# Patient Record
Sex: FEMALE | Race: WHITE | NOT HISPANIC OR LATINO
[De-identification: names, ages, dates, MRNs, and addresses within clinical notes are randomized per-mention and may not be internally consistent; named-entity substitution may affect disease eponyms.]

---

## 2024-07-24 PROBLEM — Z00.00 ENCOUNTER FOR PREVENTIVE HEALTH EXAMINATION: Status: ACTIVE | Noted: 2024-07-24

## 2024-07-26 ENCOUNTER — APPOINTMENT (OUTPATIENT)
Dept: ORTHOPEDIC SURGERY | Facility: CLINIC | Age: 79
End: 2024-07-26
Payer: MEDICARE

## 2024-07-26 DIAGNOSIS — M75.41 IMPINGEMENT SYNDROME OF RIGHT SHOULDER: ICD-10-CM

## 2024-07-26 DIAGNOSIS — M75.51 BURSITIS OF RIGHT SHOULDER: ICD-10-CM

## 2024-07-26 DIAGNOSIS — M75.111 INCOMPLETE ROTATOR CUFF TEAR OR RUPTURE OF RIGHT SHOULDER, NOT SPECIFIED AS TRAUMATIC: ICD-10-CM

## 2024-07-26 PROCEDURE — 99214 OFFICE O/P EST MOD 30 MIN: CPT | Mod: 25

## 2024-07-26 PROCEDURE — 99204 OFFICE O/P NEW MOD 45 MIN: CPT | Mod: 25

## 2024-07-26 PROCEDURE — 20611 DRAIN/INJ JOINT/BURSA W/US: CPT | Mod: RT

## 2024-07-26 PROCEDURE — 73030 X-RAY EXAM OF SHOULDER: CPT | Mod: RT

## 2024-07-26 RX ORDER — CELECOXIB 200 MG/1
200 CAPSULE ORAL
Qty: 30 | Refills: 1 | Status: ACTIVE | COMMUNITY
Start: 2024-07-26 | End: 1900-01-01

## 2024-07-26 NOTE — PHYSICAL EXAM
[Right] : right shoulder [There are no fractures, subluxations or dislocations. No significant abnormalities are seen] : There are no fractures, subluxations or dislocations. No significant abnormalities are seen [Type 2 acromion] : Type 2 acromion [de-identified] : Constitutional: The general appearance of the patient is well developed, well nourished, no deformities and well groomed. Normal   Gait: Gait and function is as follows: normal gait.   Skin: Head and neck visualized skin is normal. Left upper extremity visualized skin is normal. Right upper extremity visualized skin is normal. Thoracic Skin of the thoracic spine shows visualized skin is normal.   Cardiovascular: palpable radial pulse bilaterally, good capillary refill in digits of the bilateral upper extremities and no temperature or color changes in the bilateral upper extremities.   Lymphatic: Normal Palpation of lymph nodes in the cervical.   Neurologic: fine motor control in the bilateral upper extremities is intact. Deep Tendon Reflexes in Upper and Lower Extremities Negative Finney's in the bilateral upper extremities. The patient is oriented to time, place and person. Sensation to light touch intact in the bilateral upper extremities. Mood and Affect is normal.   Right Shoulder: Inspection of the shoulder/upper arm is as follows: no scapula winging, no biceps deformity and no AC joint deformity. Palpation of the shoulder/upper arm is as follows: There is tenderness at the proximal biceps tendon. Range of motion of the shoulder is as follows: Pain with internal rotation, external rotation, abduction and forward flexion. Strength of the shoulder is as follows: Supraspinatus 4/5. External Rotation 4/5. Internal Rotation 4/5. Deltoid 5/5 Ligament Stability and Special Tests of the shoulder is as follows: Neer test is positive. Cosme' test is positive. Speed's test is positive   Left Shoulder: Inspection of the shoulder/upper arm is as follows: There is mild pain with range of motion of the shoulder range of motion of the shoulder    Neck:   Inspection / Palpation of the cervical spine is as follows: mild paracervical tenderness. Range of motion of the cervical spine is as follows: moderately decreased range of motion of the cervical spine. Stability testing for the cervical spine is as follows Stable range of motion.   Back, including spine: Inspection / Palpation of the thoracic/lumbar spine is as follows: There is a full, pain free, stable range of motion of the thoracic spine with a normal tone and not tenderness to palpation..

## 2024-07-26 NOTE — PROCEDURE
[FreeTextEntry3] : Large Joint Injection was performed because of pain and inflammation.   Anesthesia: ethyl chloride sprayed topically..   Kenalog: An injection of Kenalog 40 mg , 1 cc.   Lidocaine: 3 cc.   Medication was injected in the right GH joint. Patient has tried OTC's including aspirin, Ibuprofen, Aleve etc or prescription NSAIDS, and/or exercises at home and/ or physical therapy without satisfactory response and Patient has decreased mobility in the joint. After verbal consent using sterile preparation and technique. The risks, benefits, and alternatives to cortisone injection were explained in full to the patient. Risks outlined include but are not limited to infection, sepsis, bleeding, scarring, skin discoloration, temporary increase in pain, syncopal episode, failure to resolve symptoms, allergic reaction, symptom recurrence, and elevation of blood sugar in diabetics. Patient understood the risks. All questions were answered. After discussion of options, patient requested an injection. Oral informed consent was obtained and sterile prep was done of the injection site. Sterile technique was utilized for the procedure including the preparation of the solutions used for the injection. Patient tolerated the procedure well. Advised to ice the injection site this evening. Prep with alcohol locally to site. Sterile technique used. Patient tolerated procedure well. Post Procedure Instructions: Patient was advised to call if redness, pain, or fever occur and apply ice for 15 min. out of every hour for the next 12-24 hours as tolerated. patient was advised to rest the joint(s) for 7 days.   Ultrasound Guidance was used for the following reasons: prior failure or difficult injection.   Ultrasound guided injection was performed of the shoulder, visualization of the needle and placement of injection was performed without complication.

## 2024-07-26 NOTE — HISTORY OF PRESENT ILLNESS
[de-identified] : 79-year-old female presenting with right shoulder pain x 2 months.  Denies any specific injury or trauma.  Pain is located to the lateral aspect of her shoulder.  She has noticed difficulty with activities daily living including getting dressed.  Denies any previous difficulty to her shoulder.  Pain is currently constant, moderate, 6 out of 10.  Denies any numbness or tingling.  Denies any radicular complaints.  Does report nighttime discomfort.

## 2024-07-26 NOTE — DISCUSSION/SUMMARY
[de-identified] : 79-year-old female with right shoulder pain x 2 months. Physical exam consistent with rotator cuff syndrome/impingement syndrome. X-rays are nondiagnostic. Pt was treated with a glenohumeral joint injection for diagnostic and therapeutic purposes Physical therapy script given Follow up 6 weeks    (1) We discussed a comprehensive treatment plans that included a prescription management plan involving the use of prescription strength medications to include Ibuprofen 600-800 mg TID, versus 500-650 mg Tylenol. We also discussed prescribing topical diclofenac (Voltaren gel) as well as once daily Meloxicam 15 mg. (2) The patient has More Than One chronic injuries/illnesses as outlined, discussed, and documented by ICD 10 codes listed, as well as the HPI and Plan section. There is a moderate risk of morbidity with further treatment, especially from use of prescription strength medications and possible side effects of these medications which include upset stomach and cardiac/renal issues with long term use were discussed. (3) I recommended that the patient follow-up with their medical physician to discuss any significant specific potential issues with long term use such as interactions with current medications or with exacerbation of underlying medical morbidities.   Attestation: I, Michelle Cross , attest that this documentation has been prepared under the direction and in the presence of Provider Alex Haq MD. The documentation recorded by the scribe, in my presence, accurately reflects the service I personally performed, and the decisions made by me with my edits as appropriate. Alex Haq MD

## 2024-08-15 ENCOUNTER — OFFICE (OUTPATIENT)
Facility: LOCATION | Age: 79
Setting detail: OPHTHALMOLOGY
End: 2024-08-15
Payer: MEDICARE

## 2024-08-15 DIAGNOSIS — H11.153: ICD-10-CM

## 2024-08-15 DIAGNOSIS — H16.223: ICD-10-CM

## 2024-08-15 DIAGNOSIS — H26.493: ICD-10-CM

## 2024-08-15 DIAGNOSIS — H35.033: ICD-10-CM

## 2024-08-15 DIAGNOSIS — H52.4: ICD-10-CM

## 2024-08-15 DIAGNOSIS — H31.29: ICD-10-CM

## 2024-08-15 PROBLEM — H01.001 BLEPHARITIS; RIGHT UPPER LID, RIGHT LOWER LID: Status: ACTIVE | Noted: 2024-08-15

## 2024-08-15 PROBLEM — H01.002 BLEPHARITIS; RIGHT UPPER LID, RIGHT LOWER LID: Status: ACTIVE | Noted: 2024-08-15

## 2024-08-15 PROBLEM — H52.13 MYOPIA; BOTH EYES: Status: ACTIVE | Noted: 2024-08-15

## 2024-08-15 PROCEDURE — 92014 COMPRE OPH EXAM EST PT 1/>: CPT | Performed by: OPTOMETRIST

## 2024-08-15 PROCEDURE — 92015 DETERMINE REFRACTIVE STATE: CPT | Performed by: OPTOMETRIST

## 2024-08-15 PROCEDURE — 92250 FUNDUS PHOTOGRAPHY W/I&R: CPT | Performed by: OPTOMETRIST

## 2024-08-15 PROCEDURE — CONTACT LE COOPER-: Performed by: OPTOMETRIST

## 2024-08-15 ASSESSMENT — LID EXAM ASSESSMENTS: OD_BLEPHARITIS: RLL RUL T

## 2024-08-15 ASSESSMENT — CONFRONTATIONAL VISUAL FIELD TEST (CVF)
OD_FINDINGS: FULL
OS_FINDINGS: FULL

## 2024-08-16 PROBLEM — Z96.1 PSEUDOPHAKIA ; RIGHT EYE: Status: ACTIVE | Noted: 2024-08-15

## 2025-06-26 ENCOUNTER — OFFICE (OUTPATIENT)
Facility: LOCATION | Age: 80
Setting detail: OPHTHALMOLOGY
End: 2025-06-26
Payer: MEDICARE

## 2025-06-26 ENCOUNTER — RX ONLY (RX ONLY)
Age: 80
End: 2025-06-26

## 2025-06-26 DIAGNOSIS — H16.223: ICD-10-CM

## 2025-06-26 DIAGNOSIS — H11.153: ICD-10-CM

## 2025-06-26 DIAGNOSIS — H52.4: ICD-10-CM

## 2025-06-26 DIAGNOSIS — H35.033: ICD-10-CM

## 2025-06-26 DIAGNOSIS — H26.493: ICD-10-CM

## 2025-06-26 DIAGNOSIS — H35.62: ICD-10-CM

## 2025-06-26 PROCEDURE — 92250 FUNDUS PHOTOGRAPHY W/I&R: CPT | Performed by: OPTOMETRIST

## 2025-06-26 PROCEDURE — 92014 COMPRE OPH EXAM EST PT 1/>: CPT | Performed by: OPTOMETRIST

## 2025-06-26 PROCEDURE — 92015 DETERMINE REFRACTIVE STATE: CPT | Mod: GA | Performed by: OPTOMETRIST

## 2025-06-26 PROCEDURE — CONTACT LE COOPER-: Performed by: OPTOMETRIST

## 2025-06-26 ASSESSMENT — REFRACTION_MANIFEST
OU_VA: 20/20
OS_VA2: 20/20
OD_ADD: +2.25
OS_SPHERE: -1.25
OU_VA: 20/20
OS_AXIS: 160
OS_VA1: 20/20
OD_AXIS: 175
OS_SPHERE: -1.50
OD_VA1: 20/25-2
OD_VA2: 20/20
OD_AXIS: 170
OD_SPHERE: -0.50
OD_ADD: +2.50
OS_ADD: +2.25
OD_VA1: 20/20
OD_CYLINDER: +1.00
OS_CYLINDER: +1.00
OD_CYLINDER: +0.75
OS_ADD: +2.50
OS_VA1: 20/20
OS_CYLINDER: SPHERE
OD_SPHERE: -0.50

## 2025-06-26 ASSESSMENT — REFRACTION_AUTOREFRACTION
OD_CYLINDER: +0.75
OS_AXIS: 161
OS_SPHERE: -1.50
OD_SPHERE: -0.50
OD_AXIS: 165
OS_CYLINDER: +0.25

## 2025-06-26 ASSESSMENT — SUPERFICIAL PUNCTATE KERATITIS (SPK)
OD_SPK: 2+
OS_SPK: 2+

## 2025-06-26 ASSESSMENT — VISUAL ACUITY
OS_BCVA: 20/20
OD_BCVA: 20/100

## 2025-06-26 ASSESSMENT — CORNEAL DYSTROPHY
OS_DYSTROPHY: ARCUS
OD_DYSTROPHY: ARCUS

## 2025-06-26 ASSESSMENT — LID EXAM ASSESSMENTS: OD_BLEPHARITIS: RLL RUL T

## 2025-06-26 ASSESSMENT — TONOMETRY
OS_IOP_MMHG: 11
OD_IOP_MMHG: 12

## 2025-06-26 ASSESSMENT — CONFRONTATIONAL VISUAL FIELD TEST (CVF)
OS_FINDINGS: FULL
OD_FINDINGS: FULL

## 2025-08-11 ENCOUNTER — APPOINTMENT (OUTPATIENT)
Dept: ORTHOPEDIC SURGERY | Facility: CLINIC | Age: 80
End: 2025-08-11
Payer: MEDICARE

## 2025-08-11 DIAGNOSIS — M75.111 INCOMPLETE ROTATOR CUFF TEAR OR RUPTURE OF RIGHT SHOULDER, NOT SPECIFIED AS TRAUMATIC: ICD-10-CM

## 2025-08-11 DIAGNOSIS — M75.41 IMPINGEMENT SYNDROME OF RIGHT SHOULDER: ICD-10-CM

## 2025-08-11 DIAGNOSIS — M75.51 BURSITIS OF RIGHT SHOULDER: ICD-10-CM

## 2025-08-11 PROCEDURE — 20610 DRAIN/INJ JOINT/BURSA W/O US: CPT | Mod: RT

## 2025-08-11 PROCEDURE — 99214 OFFICE O/P EST MOD 30 MIN: CPT | Mod: 25
